# Patient Record
Sex: FEMALE | Race: WHITE | NOT HISPANIC OR LATINO | Employment: FULL TIME | URBAN - METROPOLITAN AREA
[De-identification: names, ages, dates, MRNs, and addresses within clinical notes are randomized per-mention and may not be internally consistent; named-entity substitution may affect disease eponyms.]

---

## 2020-05-13 ENCOUNTER — NON-PROVIDER VISIT (OUTPATIENT)
Dept: URGENT CARE | Facility: CLINIC | Age: 30
End: 2020-05-13

## 2020-05-13 ENCOUNTER — OFFICE VISIT (OUTPATIENT)
Dept: URGENT CARE | Facility: CLINIC | Age: 30
End: 2020-05-13

## 2020-05-13 ENCOUNTER — HOSPITAL ENCOUNTER (OUTPATIENT)
Facility: MEDICAL CENTER | Age: 30
End: 2020-05-13
Attending: NURSE PRACTITIONER
Payer: COMMERCIAL

## 2020-05-13 VITALS
HEART RATE: 101 BPM | TEMPERATURE: 98.6 F | BODY MASS INDEX: 39.67 KG/M2 | DIASTOLIC BLOOD PRESSURE: 66 MMHG | HEIGHT: 65 IN | SYSTOLIC BLOOD PRESSURE: 120 MMHG | OXYGEN SATURATION: 95 % | WEIGHT: 238.1 LBS

## 2020-05-13 DIAGNOSIS — Z02.89 VISIT FOR OCCUPATIONAL HEALTH EXAMINATION: ICD-10-CM

## 2020-05-13 DIAGNOSIS — Z02.89 VISIT FOR OCCUPATIONAL HEALTH EXAMINATION: Primary | ICD-10-CM

## 2020-05-13 LAB
AMP AMPHETAMINE: NORMAL
BAR BARBITURATES: NORMAL
BZO BENZODIAZEPINES: NORMAL
COC COCAINE: NORMAL
INT CON NEG: NORMAL
INT CON POS: NORMAL
MDMA ECSTASY: NORMAL
MET METHAMPHETAMINES: NORMAL
MTD METHADONE: NORMAL
OPI OPIATES: NORMAL
OXY OXYCODONE: NORMAL
PCP PHENCYCLIDINE: NORMAL
POC URINE DRUG SCREEN OCDRS: NEGATIVE
THC: NORMAL

## 2020-05-13 PROCEDURE — 86480 TB TEST CELL IMMUN MEASURE: CPT | Performed by: NURSE PRACTITIONER

## 2020-05-13 PROCEDURE — 86762 RUBELLA ANTIBODY: CPT | Performed by: NURSE PRACTITIONER

## 2020-05-13 PROCEDURE — 90715 TDAP VACCINE 7 YRS/> IM: CPT | Performed by: PREVENTIVE MEDICINE

## 2020-05-13 PROCEDURE — 86765 RUBEOLA ANTIBODY: CPT | Performed by: NURSE PRACTITIONER

## 2020-05-13 PROCEDURE — 94375 RESPIRATORY FLOW VOLUME LOOP: CPT | Performed by: NURSE PRACTITIONER

## 2020-05-13 PROCEDURE — 86735 MUMPS ANTIBODY: CPT | Performed by: NURSE PRACTITIONER

## 2020-05-13 PROCEDURE — 80305 DRUG TEST PRSMV DIR OPT OBS: CPT | Performed by: NURSE PRACTITIONER

## 2020-05-13 PROCEDURE — 86787 VARICELLA-ZOSTER ANTIBODY: CPT | Performed by: NURSE PRACTITIONER

## 2020-05-13 RX ORDER — VITAMIN B COMPLEX
1000 TABLET ORAL DAILY
COMMUNITY

## 2020-05-15 LAB
GAMMA INTERFERON BACKGROUND BLD IA-ACNC: 0.14 IU/ML
M TB IFN-G BLD-IMP: NEGATIVE
M TB IFN-G CD4+ BCKGRND COR BLD-ACNC: -0.08 IU/ML
MEV IGG SER IA-ACNC: 2.49
MITOGEN IGNF BCKGRD COR BLD-ACNC: >10 IU/ML
MUV IGG SER IA-ACNC: 1.64
QFT TB2 - NIL TBQ2: -0.05 IU/ML
RUBV AB SER QL: 229 IU/ML
VZV IGG SER IA-ACNC: 1.74

## 2020-06-03 ENCOUNTER — EH NON-PROVIDER (OUTPATIENT)
Dept: OCCUPATIONAL MEDICINE | Facility: CLINIC | Age: 30
End: 2020-06-03
Payer: COMMERCIAL

## 2020-06-03 DIAGNOSIS — Z71.85 IMMUNIZATION COUNSELING: ICD-10-CM

## 2020-07-02 ENCOUNTER — TELEPHONE (OUTPATIENT)
Dept: SCHEDULING | Facility: IMAGING CENTER | Age: 30
End: 2020-07-02

## 2020-07-23 ENCOUNTER — APPOINTMENT (OUTPATIENT)
Dept: RADIOLOGY | Facility: IMAGING CENTER | Age: 30
End: 2020-07-23
Attending: NURSE PRACTITIONER
Payer: COMMERCIAL

## 2020-07-23 ENCOUNTER — OFFICE VISIT (OUTPATIENT)
Dept: MEDICAL GROUP | Facility: PHYSICIAN GROUP | Age: 30
End: 2020-07-23
Payer: COMMERCIAL

## 2020-07-23 ENCOUNTER — HOSPITAL ENCOUNTER (OUTPATIENT)
Dept: LAB | Facility: MEDICAL CENTER | Age: 30
End: 2020-07-23
Attending: NURSE PRACTITIONER
Payer: COMMERCIAL

## 2020-07-23 VITALS
WEIGHT: 249 LBS | OXYGEN SATURATION: 95 % | SYSTOLIC BLOOD PRESSURE: 126 MMHG | HEIGHT: 65 IN | BODY MASS INDEX: 41.48 KG/M2 | DIASTOLIC BLOOD PRESSURE: 72 MMHG | HEART RATE: 92 BPM | TEMPERATURE: 98 F

## 2020-07-23 DIAGNOSIS — Z13.21 ENCOUNTER FOR VITAMIN DEFICIENCY SCREENING: ICD-10-CM

## 2020-07-23 DIAGNOSIS — M62.838 MUSCLE SPASM: ICD-10-CM

## 2020-07-23 DIAGNOSIS — Z13.228 SCREENING FOR METABOLIC DISORDER: ICD-10-CM

## 2020-07-23 DIAGNOSIS — M54.50 CHRONIC BILATERAL LOW BACK PAIN WITHOUT SCIATICA: ICD-10-CM

## 2020-07-23 DIAGNOSIS — Z00.00 ROUTINE ADULT HEALTH MAINTENANCE: ICD-10-CM

## 2020-07-23 DIAGNOSIS — Z13.6 SCREENING FOR CARDIOVASCULAR CONDITION: ICD-10-CM

## 2020-07-23 DIAGNOSIS — R73.01 IMPAIRED FASTING GLUCOSE: ICD-10-CM

## 2020-07-23 DIAGNOSIS — Z13.29 SCREENING FOR THYROID DISORDER: ICD-10-CM

## 2020-07-23 DIAGNOSIS — G89.29 CHRONIC BILATERAL LOW BACK PAIN WITHOUT SCIATICA: ICD-10-CM

## 2020-07-23 DIAGNOSIS — Z30.011 ENCOUNTER FOR INITIAL PRESCRIPTION OF CONTRACEPTIVE PILLS: ICD-10-CM

## 2020-07-23 LAB
25(OH)D3 SERPL-MCNC: 50 NG/ML (ref 30–100)
ALBUMIN SERPL BCP-MCNC: 4.2 G/DL (ref 3.2–4.9)
ALBUMIN/GLOB SERPL: 1.4 G/DL
ALP SERPL-CCNC: 97 U/L (ref 30–99)
ALT SERPL-CCNC: 13 U/L (ref 2–50)
ANION GAP SERPL CALC-SCNC: 12 MMOL/L (ref 7–16)
AST SERPL-CCNC: 10 U/L (ref 12–45)
BASOPHILS # BLD AUTO: 0.7 % (ref 0–1.8)
BASOPHILS # BLD: 0.08 K/UL (ref 0–0.12)
BILIRUB SERPL-MCNC: 0.2 MG/DL (ref 0.1–1.5)
BUN SERPL-MCNC: 14 MG/DL (ref 8–22)
CALCIUM SERPL-MCNC: 9 MG/DL (ref 8.5–10.5)
CHLORIDE SERPL-SCNC: 100 MMOL/L (ref 96–112)
CHOLEST SERPL-MCNC: 166 MG/DL (ref 100–199)
CO2 SERPL-SCNC: 23 MMOL/L (ref 20–33)
CREAT SERPL-MCNC: 0.45 MG/DL (ref 0.5–1.4)
EOSINOPHIL # BLD AUTO: 0.15 K/UL (ref 0–0.51)
EOSINOPHIL NFR BLD: 1.3 % (ref 0–6.9)
ERYTHROCYTE [DISTWIDTH] IN BLOOD BY AUTOMATED COUNT: 42.3 FL (ref 35.9–50)
EST. AVERAGE GLUCOSE BLD GHB EST-MCNC: 117 MG/DL
FASTING STATUS PATIENT QL REPORTED: NORMAL
GLOBULIN SER CALC-MCNC: 2.9 G/DL (ref 1.9–3.5)
GLUCOSE SERPL-MCNC: 101 MG/DL (ref 65–99)
HBA1C MFR BLD: 5.7 % (ref 0–5.6)
HCT VFR BLD AUTO: 42 % (ref 37–47)
HDLC SERPL-MCNC: 53 MG/DL
HGB BLD-MCNC: 13.4 G/DL (ref 12–16)
IMM GRANULOCYTES # BLD AUTO: 0.05 K/UL (ref 0–0.11)
IMM GRANULOCYTES NFR BLD AUTO: 0.4 % (ref 0–0.9)
LDLC SERPL CALC-MCNC: 97 MG/DL
LYMPHOCYTES # BLD AUTO: 3.29 K/UL (ref 1–4.8)
LYMPHOCYTES NFR BLD: 28.8 % (ref 22–41)
MAGNESIUM SERPL-MCNC: 2 MG/DL (ref 1.5–2.5)
MCH RBC QN AUTO: 28.3 PG (ref 27–33)
MCHC RBC AUTO-ENTMCNC: 31.9 G/DL (ref 33.6–35)
MCV RBC AUTO: 88.6 FL (ref 81.4–97.8)
MONOCYTES # BLD AUTO: 0.69 K/UL (ref 0–0.85)
MONOCYTES NFR BLD AUTO: 6 % (ref 0–13.4)
NEUTROPHILS # BLD AUTO: 7.18 K/UL (ref 2–7.15)
NEUTROPHILS NFR BLD: 62.8 % (ref 44–72)
NRBC # BLD AUTO: 0 K/UL
NRBC BLD-RTO: 0 /100 WBC
PLATELET # BLD AUTO: 325 K/UL (ref 164–446)
PMV BLD AUTO: 10.3 FL (ref 9–12.9)
POTASSIUM SERPL-SCNC: 4 MMOL/L (ref 3.6–5.5)
PROT SERPL-MCNC: 7.1 G/DL (ref 6–8.2)
RBC # BLD AUTO: 4.74 M/UL (ref 4.2–5.4)
SODIUM SERPL-SCNC: 135 MMOL/L (ref 135–145)
TRIGL SERPL-MCNC: 79 MG/DL (ref 0–149)
TSH SERPL DL<=0.005 MIU/L-ACNC: 3.01 UIU/ML (ref 0.38–5.33)
VIT B12 SERPL-MCNC: 319 PG/ML (ref 211–911)
WBC # BLD AUTO: 11.4 K/UL (ref 4.8–10.8)

## 2020-07-23 PROCEDURE — 36415 COLL VENOUS BLD VENIPUNCTURE: CPT

## 2020-07-23 PROCEDURE — 82306 VITAMIN D 25 HYDROXY: CPT

## 2020-07-23 PROCEDURE — 72110 X-RAY EXAM L-2 SPINE 4/>VWS: CPT | Mod: TC | Performed by: NURSE PRACTITIONER

## 2020-07-23 PROCEDURE — 85025 COMPLETE CBC W/AUTO DIFF WBC: CPT

## 2020-07-23 PROCEDURE — 83735 ASSAY OF MAGNESIUM: CPT

## 2020-07-23 PROCEDURE — 84443 ASSAY THYROID STIM HORMONE: CPT

## 2020-07-23 PROCEDURE — 83036 HEMOGLOBIN GLYCOSYLATED A1C: CPT

## 2020-07-23 PROCEDURE — 99204 OFFICE O/P NEW MOD 45 MIN: CPT | Performed by: NURSE PRACTITIONER

## 2020-07-23 PROCEDURE — 82607 VITAMIN B-12: CPT

## 2020-07-23 PROCEDURE — 80061 LIPID PANEL: CPT

## 2020-07-23 PROCEDURE — 80053 COMPREHEN METABOLIC PANEL: CPT

## 2020-07-23 RX ORDER — DROSPIRENONE AND ETHINYL ESTRADIOL 0.03MG-3MG
1 KIT ORAL DAILY
Qty: 28 TAB | Refills: 11 | Status: SHIPPED | OUTPATIENT
Start: 2020-07-23 | End: 2021-09-01

## 2020-07-23 RX ORDER — CYCLOBENZAPRINE HCL 5 MG
5 TABLET ORAL 3 TIMES DAILY PRN
Qty: 30 TAB | Refills: 0 | Status: SHIPPED | OUTPATIENT
Start: 2020-07-23 | End: 2020-09-03 | Stop reason: SDUPTHER

## 2020-07-23 SDOH — HEALTH STABILITY: MENTAL HEALTH: HOW MANY STANDARD DRINKS CONTAINING ALCOHOL DO YOU HAVE ON A TYPICAL DAY?: 1 OR 2

## 2020-07-23 SDOH — HEALTH STABILITY: MENTAL HEALTH: HOW OFTEN DO YOU HAVE A DRINK CONTAINING ALCOHOL?: 2-3 TIMES A WEEK

## 2020-07-23 SDOH — HEALTH STABILITY: MENTAL HEALTH: HOW OFTEN DO YOU HAVE 6 OR MORE DRINKS ON ONE OCCASION?: NEVER

## 2020-07-23 ASSESSMENT — PATIENT HEALTH QUESTIONNAIRE - PHQ9: CLINICAL INTERPRETATION OF PHQ2 SCORE: 0

## 2020-07-23 NOTE — PROGRESS NOTES
"Chief Complaint   Patient presents with   • Establish Care   • Back Pain         Subjective:     Nena Cordova is a 29 y.o. female presenting with chronic back pain.    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  Recently moved to the area to be closer to family from [facility] in May of this year.  She has a 9-month-old and is .    Back pain: Patient developed acute back pain 2.5 months after giving birth and has experienced it chronically since totaling roughly 7 months.  Reports that is quite stiff, tender, painful on the left lower back but extends bilaterally.  Reports she feels \"locked up\", particularly when she is getting out of bed after sleeping.  She has to sleep in a semi-reclined position to help reduce pain and stiffness.  She is tried yoga, heat pads, NSAIDs and only experienced very mild relief.  She remembers having complications associated with her epidural during delivery requiring a power flush during which she experienced similar left lower back pain.  Denies saddle anesthesia, numbness or tingling of lower extremities, incontinence of bowel or bladder, shooting or radiating leg pain.    Contraception: Patient recently stopped breast-feeding, previously on the progestin only pill.  Interested in getting back on DONALD.  Has previously taken DONALD with good results, no negative side effects.  Denies bleeding or clotting disorder, migraines with aura, family history or personal history of blood clot development.    Review of systems:      Denies chest pain, shortness of breath, sore throat, difficulty swallowing, new cough, dizziness, severe headache, altered cognition, changes in bowel or bladder habits, decreased sensation, decreased strength, numbness or tingling, intolerable depression or anxiety, rash or skin concerns, changes in vision, fatigue,  painful or swollen lymph nodes.       Current " "Outpatient Medications:   •  drospirenone-ethinyl estradiol (COLIN) 3-0.03 MG per tablet, Take 1 Tab by mouth every day., Disp: 28 Tab, Rfl: 11  •  cyclobenzaprine (FLEXERIL) 5 MG tablet, Take 1 Tab by mouth 3 times a day as needed for Muscle Spasms., Disp: 30 Tab, Rfl: 0  •  vitamin D (CHOLECALCIFEROL) 1000 Unit (25 mcg) Tab, Take 1,000 Units by mouth every day., Disp: , Rfl:     Allergies, past medical history, past surgical history, family history, social history reviewed and updated    Objective:     Vitals: /72 (BP Location: Right arm, Patient Position: Sitting, BP Cuff Size: Large adult)   Pulse 92   Temp 36.7 °C (98 °F) (Temporal)   Ht 1.651 m (5' 5\")   Wt 112.9 kg (249 lb)   SpO2 95%   BMI 41.44 kg/m²   General: Alert, cooperative, dressed appropriately for weather / situation  Eyes:Normocephalic.  EOMI, no icterus or pallor.  Conjunctivae clear without erythema / irritation.  ENT:  External ears developed; Bilat TMs visualized; appear pearly without bulging, effusion, or erythema; crisp light reflex.     Lymph: Neck supple, absent of cervical or supraclavicular lymphadenopathy.  Thyroid palpated, free of masses or goiter.   Heart: Regular rate and rhythm.  S1 and S2 normal.  No murmurs auscultated; no murmurs / bruits heard over bilateral carotids.  Bilateral radial pulses strong and equal.  Bilateral posterior tibial pulses strong and equal.  Respiratory: Normal respiratory effort.  Clear to auscultation bilaterally.  AP ratio 1:2   Abdomen: Non-distended;   Musculoskeletal: Gait is normal.  Bilateral  strength strong equal.  Moves extremities freely and equally bilaterally; bilateral popliteal DTRs strong, brisk, equal.  Neuro:  AAOx3 Visual tracking intact, no nystagmus;   Psych:  Affect/mood is normal, judgement is good, memory is intact, grooming is appropriate.    Assessment/Plan:     Nena was seen today for establish care and back pain.    Diagnoses and all orders for this " visit:    Chronic bilateral low back pain without sciatica: Pain appears to be associated with muscle spasticity.  Will provide cyclobenzaprine to use as needed, advised this is quite sedating so she should use it at night and will there is another person in the home to help with the baby until she knows how her body responds to it.  Will obtain lumbar x-ray.  Will place physiatry referral for further diagnostics and treatment planning.  -     DX-LUMBAR SPINE-4+ VIEWS; Future  -     cyclobenzaprine (FLEXERIL) 5 MG tablet; Take 1 Tab by mouth 3 times a day as needed for Muscle Spasms.    Muscle spasm  -     DX-LUMBAR SPINE-4+ VIEWS; Future  -     MAGNESIUM; Future  -     cyclobenzaprine (FLEXERIL) 5 MG tablet; Take 1 Tab by mouth 3 times a day as needed for Muscle Spasms.    Encounter for initial prescription of contraceptive pills: Discussed risks of long-term estrogen use and encourage patient to consider long-acting options if she is not interested in subsequent pregnancies.  Discussed IUDs and implant.  -     drospirenone-ethinyl estradiol (COLIN) 3-0.03 MG per tablet; Take 1 Tab by mouth every day.    Routine adult health maintenance  -     VITAMIN B12; Future  -     CBC WITH DIFFERENTIAL; Future  -     Comp Metabolic Panel; Future  -     HEMOGLOBIN A1C; Future  -     Lipid Profile; Future  -     VITAMIN D,25 HYDROXY; Future  -     TSH WITH REFLEX TO FT4; Future  -     drospirenone-ethinyl estradiol (COLIN) 3-0.03 MG per tablet; Take 1 Tab by mouth every day.    Impaired fasting glucose  -     VITAMIN B12; Future  -     Comp Metabolic Panel; Future  -     HEMOGLOBIN A1C; Future    Screening for metabolic disorder  -     VITAMIN B12; Future  -     Comp Metabolic Panel; Future  -     HEMOGLOBIN A1C; Future    Screening for thyroid disorder  -     TSH WITH REFLEX TO FT4; Future    Encounter for vitamin deficiency screening  -     VITAMIN B12; Future  -     CBC WITH DIFFERENTIAL; Future  -     Comp Metabolic  Panel; Future  -     VITAMIN D,25 HYDROXY; Future  -     MAGNESIUM; Future    Screening for cardiovascular condition  -     Lipid Profile; Future    Patient to obtain fasting lab work and lumbar imaging.  I will follow-up with her regarding these results on e-Tag[facility].      Return in about 3 months (around 10/23/2020).    Patient verbalized understanding and agreed to plan of care.  Encouraged to contact me with needs via [employer] or by phone if needed.      I have placed the above orders and discussed them with an approved delegating provider.  The MA is performing the below orders under the direction of Dr Gonzalez.    Please note that this dictation was created using voice recognition software. I have made every reasonable attempt to correct obvious errors, but I expect that there are errors of grammar and possibly content that I did not discover before finalizing the note.

## 2020-09-02 ENCOUNTER — PATIENT MESSAGE (OUTPATIENT)
Dept: MEDICAL GROUP | Facility: PHYSICIAN GROUP | Age: 30
End: 2020-09-02

## 2020-09-02 ENCOUNTER — OFFICE VISIT (OUTPATIENT)
Dept: PHYSICAL MEDICINE AND REHAB | Facility: MEDICAL CENTER | Age: 30
End: 2020-09-02
Payer: COMMERCIAL

## 2020-09-02 VITALS
WEIGHT: 247.58 LBS | DIASTOLIC BLOOD PRESSURE: 64 MMHG | TEMPERATURE: 98.2 F | BODY MASS INDEX: 41.25 KG/M2 | OXYGEN SATURATION: 94 % | HEART RATE: 95 BPM | SYSTOLIC BLOOD PRESSURE: 118 MMHG | HEIGHT: 65 IN

## 2020-09-02 DIAGNOSIS — M54.50 CHRONIC BILATERAL LOW BACK PAIN WITHOUT SCIATICA: ICD-10-CM

## 2020-09-02 DIAGNOSIS — M62.838 MUSCLE SPASM: ICD-10-CM

## 2020-09-02 DIAGNOSIS — G89.29 CHRONIC BILATERAL LOW BACK PAIN WITHOUT SCIATICA: ICD-10-CM

## 2020-09-02 DIAGNOSIS — M99.04 SACRAL REGION SOMATIC DYSFUNCTION: ICD-10-CM

## 2020-09-02 DIAGNOSIS — M54.50 LUMBOSACRAL PAIN: ICD-10-CM

## 2020-09-02 PROCEDURE — 99204 OFFICE O/P NEW MOD 45 MIN: CPT | Performed by: PHYSICAL MEDICINE & REHABILITATION

## 2020-09-02 ASSESSMENT — FIBROSIS 4 INDEX: FIB4 SCORE: 0.26

## 2020-09-02 ASSESSMENT — PATIENT HEALTH QUESTIONNAIRE - PHQ9: CLINICAL INTERPRETATION OF PHQ2 SCORE: 0

## 2020-09-02 ASSESSMENT — PAIN SCALES - GENERAL: PAINLEVEL: 9=SEVERE PAIN

## 2020-09-02 NOTE — PROGRESS NOTES
"New patient note    Physiatry (physical medicine and  Rehabilitation), interventional spine and sports medicine    Date of Service: 09/02/2020    Chief complaint:   Chief Complaint   Patient presents with   • New Patient     Back pain       HISTORY    HPI: Nena Cordova 29 y.o. female who presents today for evaluation of low back pain.    She reports that she has had low back pain that started during pregnancy.  She delivered Oct. 2019.   She had a little bit of back pain during pregnancy, but this was manageable and improved.  Then, in January 2020 long weekend was difficult, slept in hotel bed.  It was about 2.5 hours each way.  Woke up with back pain.  This seems not to have resolved.  This pain is aching in her back and posterior gluteal region bilaterally with some sharp pain in the groin region bilaterally.  Sometimes, walks up with a \"sharp pain in her hip\" and this doesn't seem to be helped with stretching.  Getting in and out of bed is difficult, laying flat is difficult, recliner seems to be more comfortable.    No bowel or bladder changes.  No weakness in the legs.  No numbness or tingling in her legs. Coughing and sneezing does not worsen pain.      For her pain, she has tried chiropractic care, massage.  No help.    She has tried heating pad, some help.  Flexeril has been somewhat helpful.    She works as an RN in the NICU, working the night-shift.    Medical records review:  I reviewed the note from the referring provider Rosa Abraham A* dated 07/23/2020.  Indicated that she had started having back pain about 2.5 months after delivering and feels like she had pain in her low back associated with epidural during delivery.  No saddle anesthesia. Diagnoses included chronic low back pain without sciatica and muscle spasms.  Xrays lumbar spine and trial of cyclobenzaprine ordered.  Magnesium level was ordered, they discussed use of contraceptive pills, labwork associated with routine adult health " maintenance, impaired fasting glucose, screening for metabolic disorder, screening for thyroid disorder, encounter for vitamin deficiency screening and screening for cardiovascular condition.    Previous treatments:    Physical Therapy: No    Medications the patient is tried: muscle relaxers    Previous interventions: none     Previous surgeries to relieve the above pain:  none      ROS:   Red Flags ROS:   Fever, Chills, Sweats: Denies  Involuntary Weight Loss: Denies  Bladder Incontinence: Denies  Bowel Incontinence: Denies  Saddle Anesthesia: Denies    All other systems reviewed and negative.       PMHx:   History reviewed. No pertinent past medical history.    PSHx:   History reviewed. No pertinent surgical history.    Family history   Family History   Problem Relation Age of Onset   • Hyperlipidemia Mother    • No Known Problems Brother    • No Known Problems Daughter          Medications:   Current Outpatient Medications   Medication   • drospirenone-ethinyl estradiol (COLIN) 3-0.03 MG per tablet   • vitamin D (CHOLECALCIFEROL) 1000 Unit (25 mcg) Tab   • cyclobenzaprine (FLEXERIL) 5 MG tablet     No current facility-administered medications for this visit.        Allergies:   No Known Allergies    Social Hx:   Social History     Socioeconomic History   • Marital status:      Spouse name: Not on file   • Number of children: Not on file   • Years of education: Not on file   • Highest education level: Not on file   Occupational History   • Not on file   Social Needs   • Financial resource strain: Not on file   • Food insecurity     Worry: Not on file     Inability: Not on file   • Transportation needs     Medical: Not on file     Non-medical: Not on file   Tobacco Use   • Smoking status: Never Smoker   • Smokeless tobacco: Never Used   Substance and Sexual Activity   • Alcohol use: Yes     Frequency: 2-3 times a week     Drinks per session: 1 or 2     Binge frequency: Never   • Drug use: Never   • Sexual  "activity: Yes     Partners: Male     Birth control/protection: Pill   Lifestyle   • Physical activity     Days per week: Not on file     Minutes per session: Not on file   • Stress: Not on file   Relationships   • Social connections     Talks on phone: Not on file     Gets together: Not on file     Attends Sikh service: Not on file     Active member of club or organization: Not on file     Attends meetings of clubs or organizations: Not on file     Relationship status: Not on file   • Intimate partner violence     Fear of current or ex partner: Not on file     Emotionally abused: Not on file     Physically abused: Not on file     Forced sexual activity: Not on file   Other Topics Concern   •  Service No   • Blood Transfusions No   • Caffeine Concern No   • Occupational Exposure No   • Hobby Hazards No   • Sleep Concern Yes   • Stress Concern No   • Weight Concern No   • Special Diet No   • Back Care No   • Exercise Yes   • Bike Helmet No   • Seat Belt Yes   • Self-Exams Yes   Social History Narrative   • Not on file         EXAMINATION     Physical Exam:   Vitals: /64 (BP Location: Left arm, Patient Position: Sitting, BP Cuff Size: Adult long)   Pulse 95   Temp 36.8 °C (98.2 °F) (Temporal)   Ht 1.651 m (5' 5\")   Wt 112.3 kg (247 lb 9.2 oz)   SpO2 94%     Constitutional:   Body Habitus: Body mass index is 41.2 kg/m².  Cooperation: Fully cooperates with exam  Appearance: Well-groomed, well-nourished, not disheveled, no acute distress    Eyes: No scleral icterus, no proptosis     ENT -no obvious auditory deficits, wearing a face mask    Skin -no rashes or lesions noted     Respiratory-  breathing comfortable on room air, no audible wheezing    Cardiovascular- capillary refills less than 2 seconds. No lower extremity edema is noted.     Gastrointestinal - no obvious abdominal masses, No tenderness to palpation in the abdomen, mild tenderness over psoas muscles bilaterally, left greater than " right    Psychiatric- alert and oriented ×3. Normal affect.     Gait - normal gait, no use of ambulatory device, nonantalgic. The patient can toe walk with ease. The patient can heel walk with ease..     Musculoskeletal -   Cervical spine   Inspection: No deformities of the skin over the cervical spine. No rashes or lesions.    full  A/P ROM in all directions, without  pain      Spurling’s sign: negative bilaterally    No signs of muscular atrophy in bilateral upper extremities     No tenderness to palpation of the cervical facets    Thoracic/Lumbar Spine/Sacral Spine/Hips   Inspection: No evidence of atrophy in bilateral lower extremities throughout     ROM: full  AROM with flexion, extension, lateral flexion, and rotation bilaterally, with pain in extension in the sacrum    Palpation:   No tenderness to palpation in midline at T1-T12 levels. No tenderness to palpation in the left and right of the midline T1-L5  palpation over SI joint: positive bilaterally    palpation over buttock: positive left, negative right    palpation in hip or over the greater trochanters: negative bilaterally      Lumbar spine Special tests  Neuro tension  Straight leg test negative bilaterally      HIP  Range of motion in the hips is within normal limits in internal and external rotation bilaterally    SI joint tests  Observation patient sits on one buttocks: Negative  SI joint compression positive bilaterally    Thigh thrust test positive on the left and negative on the right    JEAN CLAUDE test positive right, negative left       Neuro       Key points for the international standards for neurological classification of spinal cord injury (ISNCSCI) to light touch.     Dermatome R L   C4 2  2   C5 2 2   C6 2 2   C7 2 2   C8 2 2   T1 2 2   T2 2 2   L2 2 2   L3 2 2   L4 2 2   L5 2 2   S1 2 2   S2 2 2         Motor Exam Upper Extremities   ? Myotome R L   Shoulder flexion C5 5 5   Elbow flexion C5 5 5   Wrist extension C6 5 5   Elbow extension C7  5 5   Finger flexion C8 5 5   Finger abduction T1 5 5         Motor Exam Lower Extremities    ? Myotome R L   Hip flexion L2 5 5   Knee extension L3 5 5   Ankle dorsiflexion L4 5 5   Toe extension L5 5 5   Ankle plantarflexion S1 5 5         Sheffield’s sign negative bilaterally   Babinski sign negative bilaterally   Clonus of the ankle negative bilaterally     Reflexes  ?  R L   Biceps  2+ 2+   Brachioradialis  2+ 2+   Patella  2+ 2+   Achilles   2+ 2+       MEDICAL DECISION MAKING    Medical records review: see under HPI section.     DATA    Labs:   Lab Results   Component Value Date/Time    SODIUM 135 07/23/2020 08:51 AM    POTASSIUM 4.0 07/23/2020 08:51 AM    CHLORIDE 100 07/23/2020 08:51 AM    CO2 23 07/23/2020 08:51 AM    ANION 12.0 07/23/2020 08:51 AM    GLUCOSE 101 (H) 07/23/2020 08:51 AM    BUN 14 07/23/2020 08:51 AM    CREATININE 0.45 (L) 07/23/2020 08:51 AM    CALCIUM 9.0 07/23/2020 08:51 AM    ASTSGOT 10 (L) 07/23/2020 08:51 AM    ALTSGPT 13 07/23/2020 08:51 AM    TBILIRUBIN 0.2 07/23/2020 08:51 AM    ALBUMIN 4.2 07/23/2020 08:51 AM    TOTPROTEIN 7.1 07/23/2020 08:51 AM    GLOBULIN 2.9 07/23/2020 08:51 AM    AGRATIO 1.4 07/23/2020 08:51 AM       No results found for: PROTHROMBTM, INR     Lab Results   Component Value Date/Time    WBC 11.4 (H) 07/23/2020 08:51 AM    RBC 4.74 07/23/2020 08:51 AM    HEMOGLOBIN 13.4 07/23/2020 08:51 AM    HEMATOCRIT 42.0 07/23/2020 08:51 AM    MCV 88.6 07/23/2020 08:51 AM    MCH 28.3 07/23/2020 08:51 AM    MCHC 31.9 (L) 07/23/2020 08:51 AM    MPV 10.3 07/23/2020 08:51 AM    NEUTSPOLYS 62.80 07/23/2020 08:51 AM    LYMPHOCYTES 28.80 07/23/2020 08:51 AM    MONOCYTES 6.00 07/23/2020 08:51 AM    EOSINOPHILS 1.30 07/23/2020 08:51 AM    BASOPHILS 0.70 07/23/2020 08:51 AM        Lab Results   Component Value Date/Time    HBA1C 5.7 (H) 07/23/2020 08:51 AM        Imaging: I personally reviewed following images, these are my reads  Xray lumbar spine 07/23/2020  There is note of minimal  retrolisthesis at L4-5.  No other degenerative changes    IMAGING radiology reads. I reviewed the following radiology reads                                                                                                                                       Results for orders placed in visit on 07/23/20   DX-LUMBAR SPINE-4+ VIEWS    Impression 1.  Minimal retrolisthesis at the L4-5 level, likely degenerative.  2.  Lumbar spine otherwise unremarkable.                                         Diagnosis   Visit Diagnoses     ICD-10-CM   1. Sacral region somatic dysfunction  M99.04   2. Lumbosacral pain  M54.5           ASSESSMENT:  Nena Cordova 29 y.o. female seen for above     Nena was seen today for new patient.    Diagnoses and all orders for this visit:    Sacral region somatic dysfunction  -     REFERRAL TO PHYSICAL THERAPY Reason for Therapy: Eval/Treat/Report    Lumbosacral pain  -     REFERRAL TO PHYSICAL THERAPY Reason for Therapy: Eval/Treat/Report        1. Discussed her symptoms, recent pregnancy and exam findings.  Suspect that she is having symptoms that are consistent with sacral dysfunction and we discussed a trial of physical therapy with a focus on pelvic floor work.  2. We discussed that if she has progression of symptoms or does not respond to PT that we can consider other treatments and diagnostic studies.  Her exam today is essentially normal neurologically.  She is agreeable to this treatment plan.    Follow-up: Return in about 2 months (around 11/2/2020).    Thank you very much for asking me to participate in Nena Cordova's care.  Please contact me with any questions or concerns.      Please note that this dictation was created using voice recognition software. I have made every reasonable attempt to correct obvious errors but there may be errors of grammar and content that I may have overlooked prior to finalization of this note.      Adolfo Baker MD  Physical Medicine and  Rehabilitation  Interventional Spine and Sports Physiatry  [facility]       CC Rosa Abraham, A

## 2020-09-03 RX ORDER — CYCLOBENZAPRINE HCL 5 MG
5 TABLET ORAL 3 TIMES DAILY PRN
Qty: 60 TAB | Refills: 1 | Status: SHIPPED | OUTPATIENT
Start: 2020-09-03

## 2020-09-22 ENCOUNTER — IMMUNIZATION (OUTPATIENT)
Dept: OCCUPATIONAL MEDICINE | Facility: CLINIC | Age: 30
End: 2020-09-22

## 2020-09-22 DIAGNOSIS — Z23 NEED FOR VACCINATION: ICD-10-CM

## 2020-09-22 PROCEDURE — 90686 IIV4 VACC NO PRSV 0.5 ML IM: CPT | Performed by: PREVENTIVE MEDICINE

## 2020-12-20 DIAGNOSIS — Z23 NEED FOR VACCINATION: ICD-10-CM

## 2021-01-26 ENCOUNTER — HOSPITAL ENCOUNTER (OUTPATIENT)
Dept: LAB | Facility: MEDICAL CENTER | Age: 31
End: 2021-01-26
Attending: EMERGENCY MEDICINE
Payer: COMMERCIAL

## 2021-01-26 LAB
COVID ORDER STATUS COVID19: NORMAL
SARS-COV-2 RNA RESP QL NAA+PROBE: NOTDETECTED
SPECIMEN SOURCE: NORMAL

## 2021-02-06 ENCOUNTER — IMMUNIZATION (OUTPATIENT)
Dept: FAMILY PLANNING/WOMEN'S HEALTH CLINIC | Facility: IMMUNIZATION CENTER | Age: 31
End: 2021-02-06
Attending: FAMILY MEDICINE
Payer: COMMERCIAL

## 2021-02-06 DIAGNOSIS — Z23 ENCOUNTER FOR VACCINATION: Primary | ICD-10-CM

## 2021-02-06 DIAGNOSIS — Z23 NEED FOR VACCINATION: ICD-10-CM

## 2021-02-06 PROCEDURE — 0011A MODERNA SARS-COV-2 VACCINE: CPT

## 2021-02-06 PROCEDURE — 91301 MODERNA SARS-COV-2 VACCINE: CPT

## 2021-03-11 ENCOUNTER — IMMUNIZATION (OUTPATIENT)
Dept: FAMILY PLANNING/WOMEN'S HEALTH CLINIC | Facility: IMMUNIZATION CENTER | Age: 31
End: 2021-03-11
Attending: INTERNAL MEDICINE
Payer: COMMERCIAL

## 2021-03-11 DIAGNOSIS — Z23 ENCOUNTER FOR VACCINATION: Primary | ICD-10-CM

## 2021-03-11 PROCEDURE — 0012A MODERNA SARS-COV-2 VACCINE: CPT

## 2021-03-11 PROCEDURE — 91301 MODERNA SARS-COV-2 VACCINE: CPT

## 2021-03-22 ENCOUNTER — EH NON-PROVIDER (OUTPATIENT)
Dept: OCCUPATIONAL MEDICINE | Facility: CLINIC | Age: 31
End: 2021-03-22

## 2021-03-22 DIAGNOSIS — Z02.89 ENCOUNTER FOR OCCUPATIONAL HEALTH EXAMINATION INVOLVING RESPIRATOR: ICD-10-CM

## 2021-03-22 PROCEDURE — 94375 RESPIRATORY FLOW VOLUME LOOP: CPT | Performed by: NURSE PRACTITIONER

## 2021-08-04 ENCOUNTER — TELEPHONE (OUTPATIENT)
Dept: OCCUPATIONAL MEDICINE | Facility: CLINIC | Age: 31
End: 2021-08-04

## 2021-08-04 NOTE — TELEPHONE ENCOUNTER
Phone Number Called: [phone] (home)       Call outcome: Spoke to patient regarding message below.    Message: Patient stated that her  loss of taste for a few hours last night, both her and her  took an at home COVID test which was negative. Patient stated that her  is able to taste now that it was most likely his PCN that he is taking that caused the reaction. She denies having any COVID related symptoms at this time. She did mention that her allergies were acting up and she took her medication and that helped a lot. She was advised to monitor her symptoms at this time and if she develops any to give us a call back.

## 2021-09-01 DIAGNOSIS — Z30.011 ENCOUNTER FOR INITIAL PRESCRIPTION OF CONTRACEPTIVE PILLS: ICD-10-CM

## 2021-09-01 DIAGNOSIS — Z00.00 ROUTINE ADULT HEALTH MAINTENANCE: ICD-10-CM

## 2021-09-01 RX ORDER — DROSPIRENONE AND ETHINYL ESTRADIOL 0.03MG-3MG
1 KIT ORAL
Qty: 84 TABLET | Refills: 0 | Status: SHIPPED | OUTPATIENT
Start: 2021-09-01

## 2023-05-10 NOTE — PROGRESS NOTES
Pre-employment medical surveillance reviewed and signed. Quantiferon result documented in immunizations. Document returned to assigned MA.       no fever and no chills.